# Patient Record
Sex: FEMALE
[De-identification: names, ages, dates, MRNs, and addresses within clinical notes are randomized per-mention and may not be internally consistent; named-entity substitution may affect disease eponyms.]

---

## 2024-09-04 ENCOUNTER — DASHBOARD ENCOUNTERS (OUTPATIENT)
Age: 24
End: 2024-09-04

## 2024-09-05 ENCOUNTER — OFFICE VISIT (OUTPATIENT)
Dept: URBAN - METROPOLITAN AREA CLINIC 78 | Facility: CLINIC | Age: 24
End: 2024-09-05
Payer: COMMERCIAL

## 2024-09-05 VITALS
HEIGHT: 69 IN | HEART RATE: 74 BPM | BODY MASS INDEX: 25.77 KG/M2 | WEIGHT: 174 LBS | TEMPERATURE: 98 F | SYSTOLIC BLOOD PRESSURE: 113 MMHG | DIASTOLIC BLOOD PRESSURE: 70 MMHG

## 2024-09-05 DIAGNOSIS — R10.84 ABDOMINAL CRAMPING, GENERALIZED: ICD-10-CM

## 2024-09-05 DIAGNOSIS — R10.13 DYSPEPSIA: ICD-10-CM

## 2024-09-05 DIAGNOSIS — K59.01 CONSTIPATION: ICD-10-CM

## 2024-09-05 PROCEDURE — 99244 OFF/OP CNSLTJ NEW/EST MOD 40: CPT | Performed by: INTERNAL MEDICINE

## 2024-09-05 PROCEDURE — 99204 OFFICE O/P NEW MOD 45 MIN: CPT | Performed by: INTERNAL MEDICINE

## 2024-09-05 RX ORDER — HYOSCYAMINE SULFATE 0.12 MG/1
1 TABLET UNDER THE TONGUE AND ALLOW TO DISSOLVE TABLET, ORALLY DISINTEGRATING ORAL
Qty: 40 | Refills: 2 | OUTPATIENT
Start: 2024-09-05 | End: 2025-06-02

## 2024-09-05 NOTE — HPI-TODAY'S VISIT:
The patient comes in self-referred to us for abdominal pain. A copy of this note will be sent to the referring physician.   She reports a 4 year history of abdominal pain. She thought it was related to constipation. She describes having a BM every 3 days. She is now taking a stool softener 1-2 times a week. If she takes a stool softener she tends to have a softer BM. She will also use Gas X prn without relief. She was diagnosed with IBS C and was started on Linzess 290mcg daily. She took that for 2-3 months. She did not feel that the pain or bloating helped, but rather it caused diarrhea.  The pain can occur randomly, diffusely. She feels her abdomen gets hard. It is mild to moderate. If she lays down the pain tends to get better.   She has not noted an assocaition between the abdominal pain and her periods or ovulation.   Lately she has noted nausea. No heartburn, vomiting or dysphagia.  She gets very bloated. She has not noted lactose to be a trigger.  There is no recent history of rectal bleeding. The patient has no pertinent additional complaints of anorexia or unintentional weight loss.   The patient does not take blood thinners.  They deny any CP or YUNG. She runs and is very active.   She does not feel she is stressed out, but does wonder whether anxiety may exacerbate her symptoms.  The patient has never had either EGD or colonoscopy previously. There is no FH of colon cancer or colon polyps. There is no FH of celiac sprue or IBD. Her sister had an ovarian teratoma.  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